# Patient Record
Sex: FEMALE | Race: WHITE
[De-identification: names, ages, dates, MRNs, and addresses within clinical notes are randomized per-mention and may not be internally consistent; named-entity substitution may affect disease eponyms.]

---

## 2019-01-24 ENCOUNTER — HOSPITAL ENCOUNTER (EMERGENCY)
Dept: HOSPITAL 61 - ER | Age: 29
Discharge: HOME | End: 2019-01-24
Payer: COMMERCIAL

## 2019-01-24 VITALS — HEIGHT: 62 IN | WEIGHT: 136 LBS | BODY MASS INDEX: 25.03 KG/M2

## 2019-01-24 VITALS — SYSTOLIC BLOOD PRESSURE: 145 MMHG | DIASTOLIC BLOOD PRESSURE: 65 MMHG

## 2019-01-24 DIAGNOSIS — R10.2: ICD-10-CM

## 2019-01-24 DIAGNOSIS — Z3A.01: ICD-10-CM

## 2019-01-24 DIAGNOSIS — O20.8: Primary | ICD-10-CM

## 2019-01-24 LAB
ANION GAP SERPL CALC-SCNC: 10 MMOL/L (ref 6–14)
APTT PPP: YELLOW S
BACTERIA #/AREA URNS HPF: 0 /HPF
BASOPHILS # BLD AUTO: 0 X10^3/UL (ref 0–0.2)
BASOPHILS NFR BLD: 1 % (ref 0–3)
BILIRUB UR QL STRIP: NEGATIVE
BUN SERPL-MCNC: 12 MG/DL (ref 7–20)
CALCIUM SERPL-MCNC: 9.6 MG/DL (ref 8.5–10.1)
CHLORIDE SERPL-SCNC: 103 MMOL/L (ref 98–107)
CO2 SERPL-SCNC: 26 MMOL/L (ref 21–32)
CREAT SERPL-MCNC: 0.5 MG/DL (ref 0.6–1)
EOSINOPHIL NFR BLD: 0.2 X10^3/UL (ref 0–0.7)
EOSINOPHIL NFR BLD: 2 % (ref 0–3)
ERYTHROCYTE [DISTWIDTH] IN BLOOD BY AUTOMATED COUNT: 12.7 % (ref 11.5–14.5)
FIBRINOGEN PPP-MCNC: CLEAR MG/DL
GFR SERPLBLD BASED ON 1.73 SQ M-ARVRAT: 149.1 ML/MIN
GLUCOSE SERPL-MCNC: 88 MG/DL (ref 70–99)
HCT VFR BLD CALC: 37.7 % (ref 36–47)
HGB BLD-MCNC: 13 G/DL (ref 12–15.5)
LYMPHOCYTES # BLD: 1.7 X10^3/UL (ref 1–4.8)
LYMPHOCYTES NFR BLD AUTO: 19 % (ref 24–48)
MCH RBC QN AUTO: 32 PG (ref 25–35)
MCHC RBC AUTO-ENTMCNC: 35 G/DL (ref 31–37)
MCV RBC AUTO: 93 FL (ref 79–100)
MONO #: 0.6 X10^3/UL (ref 0–1.1)
MONOCYTES NFR BLD: 6 % (ref 0–9)
NEUT #: 6.4 X10^3UL (ref 1.8–7.7)
NEUTROPHILS NFR BLD AUTO: 71 % (ref 31–73)
NITRITE UR QL STRIP: NEGATIVE
PH UR STRIP: 6 [PH]
PLATELET # BLD AUTO: 214 X10^3/UL (ref 140–400)
POTASSIUM SERPL-SCNC: 3.6 MMOL/L (ref 3.5–5.1)
PROT UR STRIP-MCNC: NEGATIVE MG/DL
RBC # BLD AUTO: 4.07 X10^6/UL (ref 3.5–5.4)
RBC #/AREA URNS HPF: 0 /HPF (ref 0–2)
SODIUM SERPL-SCNC: 139 MMOL/L (ref 136–145)
SQUAMOUS #/AREA URNS LPF: (no result) /LPF
U PREG PATIENT: POSITIVE
UROBILINOGEN UR-MCNC: 0.2 MG/DL
WBC # BLD AUTO: 8.9 X10^3/UL (ref 4–11)
WBC #/AREA URNS HPF: (no result) /HPF (ref 0–4)

## 2019-01-24 PROCEDURE — 87591 N.GONORRHOEAE DNA AMP PROB: CPT

## 2019-01-24 PROCEDURE — 99285 EMERGENCY DEPT VISIT HI MDM: CPT

## 2019-01-24 PROCEDURE — 86901 BLOOD TYPING SEROLOGIC RH(D): CPT

## 2019-01-24 PROCEDURE — 76801 OB US < 14 WKS SINGLE FETUS: CPT

## 2019-01-24 PROCEDURE — 81001 URINALYSIS AUTO W/SCOPE: CPT

## 2019-01-24 PROCEDURE — 36430 TRANSFUSION BLD/BLD COMPNT: CPT

## 2019-01-24 PROCEDURE — 84702 CHORIONIC GONADOTROPIN TEST: CPT

## 2019-01-24 PROCEDURE — 87491 CHLMYD TRACH DNA AMP PROBE: CPT

## 2019-01-24 PROCEDURE — 86850 RBC ANTIBODY SCREEN: CPT

## 2019-01-24 PROCEDURE — 81025 URINE PREGNANCY TEST: CPT

## 2019-01-24 PROCEDURE — 85025 COMPLETE CBC W/AUTO DIFF WBC: CPT

## 2019-01-24 PROCEDURE — 76817 TRANSVAGINAL US OBSTETRIC: CPT

## 2019-01-24 PROCEDURE — 86900 BLOOD TYPING SEROLOGIC ABO: CPT

## 2019-01-24 PROCEDURE — 80048 BASIC METABOLIC PNL TOTAL CA: CPT

## 2019-01-24 PROCEDURE — 36415 COLL VENOUS BLD VENIPUNCTURE: CPT

## 2019-01-24 NOTE — PHYS DOC
Past Medical History


Past Medical History:  No Pertinent History


Past Surgical History:  No Surgical History


Alcohol Use:  None


Drug Use:  None





Adult General


Chief Complaint


Chief Complaint:  VAGINAL BLEEDING PREGNANCY





HPI


HPI





Patient is a 28  year old femal who presents with pelvic pain and bleeding in 

pregnancy.  It is approximately 5 weeks' gestation based on the first day of 

her last menstrual period. She is a . Today, she had onset of pelvic 

cramping and vaginal bleeding. She describes bleeding equal to the amount of a 

normal first day of.. She did soak one full tampon and pad earlier today. She 

currently states her bleeding is improved. She has no persistent or severe 

pelvic pain. Denies irregular vaginal discharge. Has had no [place] medications 

with her pregnancy to this point.





Review of Systems


Review of Systems





Constitutional: Denies fever or chills 


Eyes: Denies change in visual acuity


Respiratory: Denies cough or shortness of breath 


Cardiovascular: No additional information 


GI: Denies n/v


: Denies dysuria or hematuria


Musculoskeletal: Denies back pain 


Integument: Denies rash or skin lesions 


Neurologic: Denies headache


Endocrine: Denies polyuria 





All other systems were reviewed and found to be within normal limits, except as 

documented in this note.





Allergies


Allergies





Allergies








Coded Allergies Type Severity Reaction Last Updated Verified


 


  No Known Drug Allergies    19 No











Physical Exam


Physical Exam





Constitutional: Well developed, well nourished, no acute distress, non-toxic 

appearance


HENT: Normocephalic, atraumatic, bilateral external ears normal, oropharynx 

moist


Eyes: PERRLA, EOMI, conjunctiva normal 


Neck: Normal range of motion 


Cardiovascular:Heart rate regular rhythm, no murmur 


Lungs & Thorax:  Bilateral breath sounds clear to auscultation


Abdomen: Bowel sounds normal, soft, no tenderness 


Skin: Warm, dry, no erythema, no rash. 


Back: No tenderness, no CVA tenderness 


Extremities: No edema 


Neurologic: Alert and oriented X 3


Psychologic: Affect normal





Pelvic:  Female external genitalia. Vaginal mucosa is moist and uninflamed. 

There is no pathologic appearing discharge. The cervical os is closed. No 

adnexal tenderness on the right but she is tender on the left. No cervical 

motion tenderness.





Current Patient Data


Vital Signs





 Vital Signs








  Date Time  Temp Pulse Resp B/P (MAP) Pulse Ox O2 Delivery O2 Flow Rate FiO2


 


19 21:27  82 14 126/78 (94) 98 Room Air  


 


19 19:45 98.1       





 98.1       








Lab Values





 Laboratory Tests








Test


 19


20:20 19


21:15


 


Urine Collection Type Unknown   


 


Urine Color Yellow   


 


Urine Clarity Clear   


 


Urine pH 6.0   


 


Urine Specific Gravity <=1.005   


 


Urine Protein


 Negative mg/dL


(NEG-TRACE) 





 


Urine Glucose (UA)


 Negative mg/dL


(NEG) 





 


Urine Ketones (Stick)


 Negative mg/dL


(NEG) 





 


Urine Blood


 Negative (NEG)


 





 


Urine Nitrite


 Negative (NEG)


 





 


Urine Bilirubin


 Negative (NEG)


 





 


Urine Urobilinogen Dipstick


 0.2 mg/dL (0.2


mg/dL) 





 


Urine Leukocyte Esterase


 Negative (NEG)


 





 


Urine RBC 0 /HPF (0-2)   


 


Urine WBC


 Occ /HPF (0-4)


 





 


Urine Squamous Epithelial


Cells Occ /LPF  


 





 


Urine Bacteria


 0 /HPF (0-FEW)


 





 


Urine Pregnancy Test


 Positive (NEG)


 





 


White Blood Count


 


 8.9 x10^3/uL


(4.0-11.0)


 


Red Blood Count


 


 4.07 x10^6/uL


(3.50-5.40)


 


Hemoglobin


 


 13.0 g/dL


(12.0-15.5)


 


Hematocrit


 


 37.7 %


(36.0-47.0)


 


Mean Corpuscular Volume


 


 93 fL ()





 


Mean Corpuscular Hemoglobin  32 pg (25-35)  


 


Mean Corpuscular Hemoglobin


Concent 


 35 g/dL


(31-37)


 


Red Cell Distribution Width


 


 12.7 %


(11.5-14.5)


 


Platelet Count


 


 214 x10^3/uL


(140-400)


 


Neutrophils (%) (Auto)  71 % (31-73)  


 


Lymphocytes (%) (Auto)  19 % (24-48)  L


 


Monocytes (%) (Auto)  6 % (0-9)  


 


Eosinophils (%) (Auto)  2 % (0-3)  


 


Basophils (%) (Auto)  1 % (0-3)  


 


Neutrophils # (Auto)


 


 6.4 x10^3uL


(1.8-7.7)


 


Lymphocytes # (Auto)


 


 1.7 x10^3/uL


(1.0-4.8)


 


Monocytes # (Auto)


 


 0.6 x10^3/uL


(0.0-1.1)


 


Eosinophils # (Auto)


 


 0.2 x10^3/uL


(0.0-0.7)


 


Basophils # (Auto)


 


 0.0 x10^3/uL


(0.0-0.2)


 


Maternal Serum HCG Beta


Subunit 


 1728 mIU/mL


(0-5)  H


 


Sodium Level


 


 139 mmol/L


(136-145)


 


Potassium Level


 


 3.6 mmol/L


(3.5-5.1)


 


Chloride Level


 


 103 mmol/L


()


 


Carbon Dioxide Level


 


 26 mmol/L


(21-32)


 


Anion Gap  10 (6-14)  


 


Blood Urea Nitrogen


 


 12 mg/dL


(7-20)


 


Creatinine


 


 0.5 mg/dL


(0.6-1.0)  L


 


Estimated GFR


(Cockcroft-Gault) 


 149.1  





 


Glucose Level


 


 88 mg/dL


(70-99)


 


Calcium Level


 


 9.6 mg/dL


(8.5-10.1)





 Laboratory Tests


19 21:15








 Laboratory Tests


19 21:15











Microbiology


19 Wet Prep - Final, Complete


          








EKG


EKG


[]





Radiology/Procedures


Radiology/Procedures


[]





Course & Med Decision Making


Course & Med Decision Making


Pertinent Labs and Imaging studies reviewed. (See chart for details)





Patient was evaluated on arrival to her room. She has no acute distress. Pelvic 

exam as documented above. Exam was accompanied by female registered nurse. 

Standard miscarriage workup is ordered.





23:00:  Pelvic exam is reassuring. The cervical os was visualized to be closed. 

Ultrasound also did not reveal any findings concerning for acute loss of 

pregnancy. The patient's Quant is 1700. Patient was a negative blood type. She 

was given RhoGAM in the ER. Plan is for discharge home. The patient is advised 

to follow-up with her OB doctor in the next 4 days for repeat hCG level.





Dragon Disclaimer


Dragon Disclaimer


This electronic medical record was generated, in whole or in part, using a 

voice recognition dictation system.





Departure


Departure


Disposition:  01 HOME, SELF-CARE


Condition:  GOOD


Referrals:  


NO PCP (PCP)











SATHISH TOWNSEND DO 2019 21:26

## 2019-01-25 NOTE — RAD
Examination: OB <14 WKS W/TV

 

History: VAG BLEEDING

 

Comparison/Correlation: None

 

Findings: Transabdominal and transvaginal pelvic ultrasound exam were 

performed. Transvaginal technique was utilized to better assess the 

adnexal structures.

 

Uterus measures 8 cm x 5 cm x 4 cm. Uterus is retroverted. Intrauterine 

gestational sac appears be present. It is small in size. Mean diameter of 

0.3 cm corresponds to 5 weeks 0 day. No yolk sac or fetal pole identified.

Myometrium is unremarkable.

 

Right ovary measures 2.1 cm x 1.5 cm x 1.3 cm. Left ovary measures 2.3 cm 

x 1.7 cm x 1.8 cm. Right adnexal follicle measuring 1.1 cm diameter. Left 

adnexal complex follicle measuring 2.1 cm represents a hemorrhagic corpus 

luteum cyst.

 

 

Impression:

Intrauterine gestational sac appears be present corresponding to 5 weeks 0

day gestation. No fetal pole or yolk sac. May represent early stage of 

pregnancy. Serial beta hCG and possibly follow-up ultrasound should be 

considered for further assessment.

 

Left adnexal corpus luteum cyst is suspected.

 

Electronically signed by: Itz Bee MD (1/25/2019 12:23 AM) [facility]